# Patient Record
(demographics unavailable — no encounter records)

---

## 2024-10-11 NOTE — HISTORY OF PRESENT ILLNESS
[Hepatitis B] : Hepatitis B [Varicella] : Varicella [Tdap] : Tdap [IPV] : IPV [FreeTextEntry1] : 14 year old male here for vaccine visit. No problems with previous vaccines and no allergy to latex or vaccine components. Feels well today.

## 2024-10-11 NOTE — DISCUSSION/SUMMARY
[FreeTextEntry1] : 14 year old male who was updated with Hep B, Tdap, IPV and Varicella. Tolerated well and observed in HC for 15 minutes. Will RTC next week for more vaccines. Left to RT class after visit.  [] : The components of the vaccine(s) to be administered today are listed in the plan of care. The disease(s) for which the vaccine(s) are intended to prevent and the risks have been discussed with the caretaker.  The risks are also included in the appropriate vaccination information statements which have been provided to the patient's caregiver.  The caregiver has given consent to vaccinate.

## 2024-11-20 NOTE — HISTORY OF PRESENT ILLNESS
[Varicella] : Varicella [FreeTextEntry1] : 14 year old male presenting for immunizations.   Pt denies history of adverse reaction to vaccines in the past. Pt denies history of asthma, seizures, anaphylaxis, thrombocytopenia, Guillain [place], blood transfusion, or latex allergy. Pt denies presence of any immunocompromised individuals in the home. Pt denies recent illness. Pt reports recent cold-like symptoms. Cold improving. Pt is taking Vitamin C for symptoms.

## 2024-11-20 NOTE — DISCUSSION/SUMMARY
[FreeTextEntry1] : 14-year-old male presenting for varicella vaccine. Vaccine given without incident. Return to health center on or after 4/25/25 for Hepatitis A & HPV vaccines.